# Patient Record
Sex: MALE | Race: WHITE | NOT HISPANIC OR LATINO | Employment: FULL TIME | URBAN - METROPOLITAN AREA
[De-identification: names, ages, dates, MRNs, and addresses within clinical notes are randomized per-mention and may not be internally consistent; named-entity substitution may affect disease eponyms.]

---

## 2023-03-16 ENCOUNTER — OFFICE VISIT (OUTPATIENT)
Dept: PRIMARY CARE | Facility: CLINIC | Age: 33
End: 2023-03-16
Payer: MEDICAID

## 2023-03-16 ENCOUNTER — TELEPHONE (OUTPATIENT)
Dept: PRIMARY CARE | Facility: CLINIC | Age: 33
End: 2023-03-16

## 2023-03-16 VITALS
HEIGHT: 72 IN | DIASTOLIC BLOOD PRESSURE: 86 MMHG | TEMPERATURE: 96.8 F | SYSTOLIC BLOOD PRESSURE: 148 MMHG | WEIGHT: 197 LBS | OXYGEN SATURATION: 98 % | HEART RATE: 78 BPM | BODY MASS INDEX: 26.68 KG/M2 | RESPIRATION RATE: 16 BRPM

## 2023-03-16 DIAGNOSIS — F32.A ANXIETY AND DEPRESSION: ICD-10-CM

## 2023-03-16 DIAGNOSIS — F41.9 ANXIETY AND DEPRESSION: ICD-10-CM

## 2023-03-16 DIAGNOSIS — F41.0 SEVERE ANXIETY WITH PANIC: Primary | ICD-10-CM

## 2023-03-16 DIAGNOSIS — I15.8 OTHER SECONDARY HYPERTENSION: ICD-10-CM

## 2023-03-16 PROBLEM — S02.2XXD CLOSED FRACTURE OF NASAL BONE WITH ROUTINE HEALING: Status: RESOLVED | Noted: 2023-03-16 | Resolved: 2023-03-16

## 2023-03-16 PROBLEM — K29.50 CHRONIC GASTRITIS: Status: ACTIVE | Noted: 2023-03-16

## 2023-03-16 PROBLEM — K57.32 DIVERTICULITIS OF COLON: Status: RESOLVED | Noted: 2023-03-16 | Resolved: 2023-03-16

## 2023-03-16 PROBLEM — H81.10 BENIGN PAROXYSMAL POSITIONAL VERTIGO: Status: ACTIVE | Noted: 2023-03-16

## 2023-03-16 PROBLEM — R63.4 WEIGHT LOSS, UNINTENTIONAL: Status: RESOLVED | Noted: 2023-03-16 | Resolved: 2023-03-16

## 2023-03-16 PROCEDURE — 3077F SYST BP >= 140 MM HG: CPT

## 2023-03-16 PROCEDURE — 99212 OFFICE O/P EST SF 10 MIN: CPT

## 2023-03-16 PROCEDURE — 3079F DIAST BP 80-89 MM HG: CPT

## 2023-03-16 RX ORDER — PANTOPRAZOLE SODIUM 40 MG/1
40 TABLET, DELAYED RELEASE ORAL 2 TIMES DAILY
COMMUNITY
End: 2023-06-29 | Stop reason: ALTCHOICE

## 2023-03-16 RX ORDER — BUPROPION HYDROCHLORIDE 75 MG/1
75 TABLET ORAL DAILY
COMMUNITY
Start: 2023-03-15 | End: 2023-06-29 | Stop reason: ALTCHOICE

## 2023-03-16 RX ORDER — HYDROXYZINE PAMOATE 25 MG/1
25 CAPSULE ORAL EVERY 6 HOURS PRN
COMMUNITY
Start: 2023-03-15 | End: 2023-06-29 | Stop reason: ALTCHOICE

## 2023-03-16 SDOH — ECONOMIC STABILITY: FOOD INSECURITY: WITHIN THE PAST 12 MONTHS, THE FOOD YOU BOUGHT JUST DIDN'T LAST AND YOU DIDN'T HAVE MONEY TO GET MORE.: NEVER TRUE

## 2023-03-16 SDOH — ECONOMIC STABILITY: FOOD INSECURITY: WITHIN THE PAST 12 MONTHS, YOU WORRIED THAT YOUR FOOD WOULD RUN OUT BEFORE YOU GOT MONEY TO BUY MORE.: NEVER TRUE

## 2023-03-16 ASSESSMENT — ANXIETY QUESTIONNAIRES
GAD7 TOTAL SCORE: 21
4. TROUBLE RELAXING: NEARLY EVERY DAY
3. WORRYING TOO MUCH ABOUT DIFFERENT THINGS: NEARLY EVERY DAY
7. FEELING AFRAID AS IF SOMETHING AWFUL MIGHT HAPPEN: NEARLY EVERY DAY
6. BECOMING EASILY ANNOYED OR IRRITABLE: NEARLY EVERY DAY
2. NOT BEING ABLE TO STOP OR CONTROL WORRYING: NEARLY EVERY DAY
1. FEELING NERVOUS, ANXIOUS, OR ON EDGE: NEARLY EVERY DAY
5. BEING SO RESTLESS THAT IT IS HARD TO SIT STILL: NEARLY EVERY DAY

## 2023-03-16 ASSESSMENT — PATIENT HEALTH QUESTIONNAIRE - PHQ9
1. LITTLE INTEREST OR PLEASURE IN DOING THINGS: NOT AT ALL
SUM OF ALL RESPONSES TO PHQ9 QUESTIONS 1 & 2: 0
2. FEELING DOWN, DEPRESSED OR HOPELESS: NOT AT ALL

## 2023-03-16 ASSESSMENT — COLUMBIA-SUICIDE SEVERITY RATING SCALE - C-SSRS
2. HAVE YOU ACTUALLY HAD ANY THOUGHTS OF KILLING YOURSELF?: NO
6. HAVE YOU EVER DONE ANYTHING, STARTED TO DO ANYTHING, OR PREPARED TO DO ANYTHING TO END YOUR LIFE?: NO
1. IN THE PAST MONTH, HAVE YOU WISHED YOU WERE DEAD OR WISHED YOU COULD GO TO SLEEP AND NOT WAKE UP?: NO

## 2023-03-16 ASSESSMENT — ENCOUNTER SYMPTOMS
HEADACHES: 1
LOSS OF SENSATION IN FEET: 0
ABDOMINAL PAIN: 1
HYPERTENSION: 1
SHORTNESS OF BREATH: 0
ROS GI COMMENTS: ANOREXIA
SWEATS: 0
DEPRESSION: 0
NECK PAIN: 0
HYPERACTIVE: 1
NERVOUS/ANXIOUS: 1
ORTHOPNEA: 0
OCCASIONAL FEELINGS OF UNSTEADINESS: 0
PND: 0
PALPITATIONS: 1
BLURRED VISION: 0

## 2023-03-16 ASSESSMENT — LIFESTYLE VARIABLES
SKIP TO QUESTIONS 9-10: 1
HOW OFTEN DO YOU HAVE SIX OR MORE DRINKS ON ONE OCCASION: NEVER
AUDIT-C TOTAL SCORE: 0
HOW MANY STANDARD DRINKS CONTAINING ALCOHOL DO YOU HAVE ON A TYPICAL DAY: PATIENT DOES NOT DRINK
HOW OFTEN DO YOU HAVE A DRINK CONTAINING ALCOHOL: NEVER

## 2023-03-16 ASSESSMENT — PAIN SCALES - GENERAL: PAINLEVEL: 0-NO PAIN

## 2023-03-16 NOTE — PATIENT INSTRUCTIONS
Thank you for coming to see me today.  If you have any questions or concerns following our visit, please contact the office.  Phone: [phone]    Follow up with Dr. Brumfield as scheduled    1)  Purchase a blood pressure cuff. Check blood pressures at home 4-5 times per week, making sure you rest 5 minutes prior to taking a reading.  Write the readings down on blood pressure log and bring this log to your next visit with me.     2) Increase regular exercise- if you're having trouble doing this, set a goal to walk twice per week for 10-15 minutes per session.  As time goes on increase days of the week and duration of exercise.  The American [employer] Association recommends 150 minutes of moderate intensity exercise per week.

## 2023-03-16 NOTE — TELEPHONE ENCOUNTER
Patient called in and was at er yesterday due to feeling dizzy and left leg was feeling tight, patient bp last night was 144/108, patient is wishing to be advised, has appt with you 4/4 but would like to be seen further

## 2023-03-16 NOTE — PROGRESS NOTES
Subjective   Patient ID: Americo Elder is a 32 y.o. male who presents for ED follow up for anxiety and hypertension.    Was seen in ED on 3/15/2023 for severe LLQ pain.  Was started on wellbutrin 75mg daily and given PRN hydroxyzine to manage anxiety.  Was seen by gastroenterology earlier today who recommended improved anxiety and blood pressure control to help with stomach pain.    Last week started taking himself off of marijuana which he was using to help manage his anxiety.  After stopping started having issues with anxiety, today was first day he was able to eat lunch without terrible upset stomach.      Former meth around age 24, quit 2016. Brother  in 2019 from meth overdose at age 31 which was when acute anxiety started kicking in.     Review of Systems   Respiratory:  Negative for shortness of breath.    Cardiovascular:  Positive for palpitations. Negative for chest pain.   Gastrointestinal:  Positive for abdominal pain.        Anorexia   Musculoskeletal:  Negative for neck pain.   Neurological:  Positive for headaches.   Psychiatric/Behavioral:  The patient is nervous/anxious and is hyperactive.         Insomnia        Current Outpatient Medications   Medication Sig Dispense Refill    buPROPion (Wellbutrin) 75 mg tablet Take 1 tablet (75 mg) by mouth once daily.      hydrOXYzine pamoate (Vistaril) 25 mg capsule Take 1 capsule (25 mg) by mouth every 6 hours if needed for anxiety.      pantoprazole (ProtoNix) 40 mg EC tablet Take 1 tablet (40 mg) by mouth in the morning and 1 tablet (40 mg) in the evening.       No current facility-administered medications for this visit.     Past Surgical History:   Procedure Laterality Date    OTHER SURGICAL HISTORY  2019    Cholecystectomy    WISDOM TOOTH EXTRACTION       Family History   Problem Relation Name Age of Onset    Other (htn) Mother      Other (bladder can) Maternal Grandmother      Other (htn) Maternal Grandmother      Clotting disorder  Other maternal uncle       Social History     Tobacco Use    Smoking status: Former     Types: Cigarettes     Quit date: 2023     Years since quittin.1    Smokeless tobacco: Never   Vaping Use    Vaping status: Every Day     Passive vaping exposure: Yes   Substance Use Topics    Alcohol use: Never    Drug use: Never        Objective     Visit Vitals  /86 (BP Location: Left arm, Patient Position: Sitting, BP Cuff Size: Adult)   Pulse 78   Temp 36 °C (96.8 °F) (Temporal)   Resp 16   Ht 1.829 m (6')   Wt 89.4 kg (197 lb)   SpO2 98%   BMI 26.72 kg/m²   Smoking Status Former   BSA 2.13 m²        Physical Exam  HENT:      Head: Normocephalic and atraumatic.      Right Ear: Tympanic membrane and ear canal normal.      Left Ear: Tympanic membrane and ear canal normal.      Nose: Nose normal. No congestion or rhinorrhea.   Eyes:      Extraocular Movements: Extraocular movements intact.      Conjunctiva/sclera: Conjunctivae normal.      Pupils: Pupils are equal, round, and reactive to light.   Neck:      Vascular: No carotid bruit.   Cardiovascular:      Rate and Rhythm: Normal rate and regular rhythm.      Pulses: Normal pulses.      Heart sounds: Normal heart sounds.   Pulmonary:      Effort: Pulmonary effort is normal.      Breath sounds: Normal breath sounds.   Abdominal:      General: Bowel sounds are normal.      Palpations: Abdomen is soft.   Musculoskeletal:         General: Normal range of motion.      Cervical back: Normal range of motion.   Skin:     General: Skin is warm and dry.   Neurological:      General: No focal deficit present.      Mental Status: He is alert and oriented to person, place, and time.   Psychiatric:         Mood and Affect: Mood normal.         Behavior: Behavior normal.           Assessment/Plan   Problem List Items Addressed This Visit       Anxiety and depression     Started on Wellbutrin 75mg last night for severe anxiety  Continue  Hydroxyzine twice daily for panic  attacks  Referrals to psychiatry and psychology offered today           Other secondary hypertension     Advised to keep blood pressure log and bring into office with with Dr. Brumfield in April  Advised to seek emergency care for  or greater           Other Visit Diagnoses       Severe anxiety with panic    -  Primary    Relevant Orders    Referral to Psychiatry    Referral to Psychology            All pertinent lab work and results were reviewed with patient.     Follow up with Dr. Brumfield as scheduled in April    Answers submitted by the patient for this visit:  High Blood Pressure Questionnaire (Submitted on 3/16/2023)  Chief Complaint: Hypertension  Chronicity: chronic  Onset: more than 1 year ago  Condition status: uncontrolled  anxiety: Yes  blurred vision: No  malaise/fatigue: No  orthopnea: No  peripheral edema: No  PND: No  sweats: No  Agents associated with hypertension: no associated agents  CAD risks: no known risk factors  Compliance problems: diet, exercise

## 2023-03-16 NOTE — ASSESSMENT & PLAN NOTE
Started on Wellbutrin 75mg last night for severe anxiety  Continue  Hydroxyzine twice daily for panic attacks  Referrals to psychiatry and psychology offered today

## 2023-03-16 NOTE — ASSESSMENT & PLAN NOTE
Advised to keep blood pressure log and bring into office with with Dr. Brumfield in April  Advised to seek emergency care for  or greater

## 2023-04-03 PROBLEM — S80.10XA CONTUSION OF LOWER LEG: Status: ACTIVE | Noted: 2023-04-03

## 2023-04-03 PROBLEM — S93.492A SPRAIN OF ANTERIOR TALOFIBULAR LIGAMENT OF LEFT ANKLE: Status: ACTIVE | Noted: 2023-04-03

## 2023-04-03 PROBLEM — L02.01 ABSCESS OF FOREHEAD: Status: ACTIVE | Noted: 2023-04-03

## 2023-04-03 PROBLEM — B34.9 VIRAL SYNDROME: Status: ACTIVE | Noted: 2023-04-03

## 2023-04-03 PROBLEM — R07.9 CHEST PAIN: Status: ACTIVE | Noted: 2023-04-03

## 2023-04-03 PROBLEM — R51.9 FREQUENT HEADACHES: Status: ACTIVE | Noted: 2023-04-03

## 2023-04-03 PROBLEM — R10.32 ABDOMINAL PAIN, LLQ (LEFT LOWER QUADRANT): Status: ACTIVE | Noted: 2023-04-03

## 2023-04-03 PROBLEM — R10.9 ABDOMINAL PAIN: Status: ACTIVE | Noted: 2023-04-03

## 2023-04-03 PROBLEM — S02.2XXD CLOSED FRACTURE OF NASAL BONE WITH ROUTINE HEALING: Status: ACTIVE | Noted: 2023-04-03

## 2023-04-03 PROBLEM — Z20.822 SUSPECTED COVID-19 VIRUS INFECTION: Status: ACTIVE | Noted: 2023-04-03

## 2023-04-03 PROBLEM — R07.89 DISCOMFORT OF CHEST WALL: Status: ACTIVE | Noted: 2023-04-03

## 2023-04-03 PROBLEM — M54.2 NECK PAIN: Status: ACTIVE | Noted: 2023-04-03

## 2023-04-03 PROBLEM — R21 RASH: Status: ACTIVE | Noted: 2023-04-03

## 2023-04-03 PROBLEM — H66.001 NON-RECURRENT ACUTE SUPPURATIVE OTITIS MEDIA OF RIGHT EAR WITHOUT SPONTANEOUS RUPTURE OF TYMPANIC MEMBRANE: Status: ACTIVE | Noted: 2023-04-03

## 2023-04-03 PROBLEM — H61.23 BILATERAL IMPACTED CERUMEN: Status: ACTIVE | Noted: 2023-04-03
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
## 2023-04-03 PROBLEM — R42 POSTURAL DIZZINESS: Status: ACTIVE | Noted: 2023-04-03

## 2023-04-03 RX ORDER — PANTOPRAZOLE SODIUM 40 MG/1
1 TABLET, DELAYED RELEASE ORAL DAILY
COMMUNITY

## 2023-04-21 PROBLEM — R36.9 PENILE DISCHARGE: Status: ACTIVE | Noted: 2023-04-21

## 2023-06-29 ENCOUNTER — OFFICE VISIT (OUTPATIENT)
Dept: PRIMARY CARE | Facility: CLINIC | Age: 33
End: 2023-06-29
Payer: MEDICAID

## 2023-06-29 VITALS
OXYGEN SATURATION: 97 % | HEIGHT: 71 IN | SYSTOLIC BLOOD PRESSURE: 129 MMHG | WEIGHT: 214 LBS | HEART RATE: 74 BPM | BODY MASS INDEX: 29.96 KG/M2 | DIASTOLIC BLOOD PRESSURE: 80 MMHG

## 2023-06-29 DIAGNOSIS — I10 HYPERTENSION, UNSPECIFIED TYPE: Primary | ICD-10-CM

## 2023-06-29 DIAGNOSIS — E66.3 OVERWEIGHT WITH BODY MASS INDEX (BMI) OF 29 TO 29.9 IN ADULT: ICD-10-CM

## 2023-06-29 PROBLEM — K59.00 CONSTIPATION: Status: ACTIVE | Noted: 2023-06-29

## 2023-06-29 PROCEDURE — 3008F BODY MASS INDEX DOCD: CPT | Performed by: STUDENT IN AN ORGANIZED HEALTH CARE EDUCATION/TRAINING PROGRAM

## 2023-06-29 PROCEDURE — 3074F SYST BP LT 130 MM HG: CPT | Performed by: STUDENT IN AN ORGANIZED HEALTH CARE EDUCATION/TRAINING PROGRAM

## 2023-06-29 PROCEDURE — 99213 OFFICE O/P EST LOW 20 MIN: CPT | Performed by: STUDENT IN AN ORGANIZED HEALTH CARE EDUCATION/TRAINING PROGRAM

## 2023-06-29 PROCEDURE — 3079F DIAST BP 80-89 MM HG: CPT | Performed by: STUDENT IN AN ORGANIZED HEALTH CARE EDUCATION/TRAINING PROGRAM

## 2023-06-29 RX ORDER — LISINOPRIL 2.5 MG/1
2.5 TABLET ORAL DAILY
Qty: 30 TABLET | Refills: 1 | Status: SHIPPED | OUTPATIENT
Start: 2023-06-29 | End: 2023-08-28

## 2023-06-29 RX ORDER — OMEPRAZOLE 20 MG/1
TABLET, DELAYED RELEASE ORAL
COMMUNITY
Start: 2019-02-15 | End: 2023-06-29 | Stop reason: ALTCHOICE

## 2023-06-29 RX ORDER — AMITRIPTYLINE HYDROCHLORIDE 25 MG/1
TABLET, FILM COATED ORAL
COMMUNITY
Start: 2023-06-15 | End: 2023-06-29 | Stop reason: ALTCHOICE

## 2023-06-29 ASSESSMENT — ENCOUNTER SYMPTOMS
CONSTIPATION: 0
FEVER: 0
WHEEZING: 0
PALPITATIONS: 0
CONFUSION: 0
NAUSEA: 0
UNEXPECTED WEIGHT CHANGE: 0
CHILLS: 0
ABDOMINAL PAIN: 0
VOMITING: 0
COLOR CHANGE: 0
MUSCULOSKELETAL NEGATIVE: 1
FATIGUE: 0
SHORTNESS OF BREATH: 0
COUGH: 0
DIARRHEA: 0
HEADACHES: 0
DIZZINESS: 0

## 2023-06-29 NOTE — PROGRESS NOTES
"Subjective   Patient ID: Americo Elder is a 32 y.o. male who presents for Follow-up (Last seen by Dr Brumfield 3/16/22.  Saw [facility] 3/13/23 for htn) and Hypertension (Pt was at gastro 6/16 and bp was 144/101  Pt has bp cuff and it runs high).    HPI   He  is here for FU on high BP. Reports he is having elevated BP at the dr's offices and also at home. Home BP: 130-140/. He had high BP at [facility], 148/86, also had high BP at GI's office (142/100); his IO BP today is 129/80. Reports fh/o HTN in parents. Denies prev h/o HTN. He vapes nicotine.     Review of Systems   Constitutional:  Negative for chills, fatigue, fever and unexpected weight change.   HENT: Negative.     Respiratory:  Negative for cough, shortness of breath and wheezing.    Cardiovascular:  Negative for chest pain, palpitations and leg swelling.   Gastrointestinal:  Negative for abdominal pain, constipation, diarrhea, nausea and vomiting.   Musculoskeletal: Negative.    Skin:  Negative for color change and rash.   Neurological:  Negative for dizziness and headaches.   Psychiatric/Behavioral:  Negative for behavioral problems and confusion.        Objective   /80 (BP Location: Right arm, Patient Position: Sitting, BP Cuff Size: Small adult)   Pulse 74   Ht 1.803 m (5' 11\")   Wt 97.1 kg (214 lb)   SpO2 97%   BMI 29.85 kg/m²     Physical Exam  Vitals and nursing note reviewed.   Constitutional:       Appearance: Normal appearance. He is obese.      Comments: Young dtr in the room.    Cardiovascular:      Rate and Rhythm: Normal rate and regular rhythm.      Pulses: Normal pulses.      Heart sounds: Normal heart sounds.   Pulmonary:      Effort: Pulmonary effort is normal. No respiratory distress.      Breath sounds: Normal breath sounds.   Abdominal:      General: Abdomen is flat. Bowel sounds are normal.      Palpations: Abdomen is soft.   Musculoskeletal:         General: Normal range of motion.   Neurological:      General: No focal deficit " present.      Mental Status: He is alert and oriented to person, place, and time.   Psychiatric:         Mood and Affect: Mood normal.         Behavior: Behavior normal.       Assessment/Plan   He  is here for FU on high BP. He has multiple elevated BP (except today) meeting criteria for HTN. We will start on low dose lisinopril 2.5 mg daily, optimize as needed at NOV. D/ possible SE profiles. BMP ordered. Rec heart healthy diet; DASH diet; cut down/quit vaping; work on optimizing weight. Keep BP logs for NOV.   Problem List Items Addressed This Visit    None  Visit Diagnoses       Hypertension, unspecified type    -  Primary    Relevant Medications    lisinopril 2.5 mg tablet    Other Relevant Orders    Basic metabolic panel    Overweight with body mass index (BMI) of 29 to 29.9 in adult              Rtc 6-8 wks for FU    Osmany Brumfield MD    [facility]